# Patient Record
Sex: MALE | Race: WHITE | NOT HISPANIC OR LATINO
[De-identification: names, ages, dates, MRNs, and addresses within clinical notes are randomized per-mention and may not be internally consistent; named-entity substitution may affect disease eponyms.]

---

## 2020-01-24 PROBLEM — Z00.00 ENCOUNTER FOR PREVENTIVE HEALTH EXAMINATION: Status: ACTIVE | Noted: 2020-01-24

## 2020-01-27 ENCOUNTER — NON-APPOINTMENT (OUTPATIENT)
Age: 85
End: 2020-01-27

## 2020-01-27 ENCOUNTER — APPOINTMENT (OUTPATIENT)
Dept: OPHTHALMOLOGY | Facility: CLINIC | Age: 85
End: 2020-01-27
Payer: MEDICARE

## 2020-01-27 PROCEDURE — 92004 COMPRE OPH EXAM NEW PT 1/>: CPT

## 2021-01-25 ENCOUNTER — APPOINTMENT (OUTPATIENT)
Dept: OPHTHALMOLOGY | Facility: CLINIC | Age: 86
End: 2021-01-25

## 2023-08-07 ENCOUNTER — NON-APPOINTMENT (OUTPATIENT)
Age: 88
End: 2023-08-07

## 2023-08-09 ENCOUNTER — APPOINTMENT (OUTPATIENT)
Dept: OPHTHALMOLOGY | Facility: CLINIC | Age: 88
End: 2023-08-09
Payer: MEDICARE

## 2023-08-09 ENCOUNTER — NON-APPOINTMENT (OUTPATIENT)
Age: 88
End: 2023-08-09

## 2023-08-09 PROCEDURE — 92004 COMPRE OPH EXAM NEW PT 1/>: CPT

## 2023-08-09 PROCEDURE — 92134 CPTRZ OPH DX IMG PST SGM RTA: CPT

## 2023-08-15 RX ORDER — OFLOXACIN 0.3 %
1 DROPS OPHTHALMIC (EYE)
Refills: 0 | Status: COMPLETED | OUTPATIENT
Start: 2023-08-17 | End: 2023-08-17

## 2023-08-15 RX ORDER — TROPICAMIDE 1 %
1 DROPS OPHTHALMIC (EYE)
Refills: 0 | Status: COMPLETED | OUTPATIENT
Start: 2023-08-17 | End: 2023-08-17

## 2023-08-15 RX ORDER — KETOROLAC TROMETHAMINE 0.5 %
1 DROPS OPHTHALMIC (EYE)
Refills: 0 | Status: COMPLETED | OUTPATIENT
Start: 2023-08-17 | End: 2023-08-17

## 2023-08-15 RX ORDER — CYCLOPENTOLATE HYDROCHLORIDE 10 MG/ML
1 SOLUTION/ DROPS OPHTHALMIC
Refills: 0 | Status: COMPLETED | OUTPATIENT
Start: 2023-08-17 | End: 2023-08-17

## 2023-08-15 RX ORDER — SODIUM CHLORIDE 9 MG/ML
1000 INJECTION, SOLUTION INTRAVENOUS
Refills: 0 | Status: DISCONTINUED | OUTPATIENT
Start: 2023-08-17 | End: 2023-08-17

## 2023-08-15 RX ORDER — PHENYLEPHRINE HCL 2.5 %
1 DROPS OPHTHALMIC (EYE)
Refills: 0 | Status: COMPLETED | OUTPATIENT
Start: 2023-08-17 | End: 2023-08-17

## 2023-08-16 NOTE — ASU PATIENT PROFILE, ADULT - NSICDXPASTSURGICALHX_GEN_ALL_CORE_FT
PAST SURGICAL HISTORY:  H/O hernia repair     H/O prostatectomy     History of tonsillectomy     Status post Mohs surgery

## 2023-08-16 NOTE — ASU PATIENT PROFILE, ADULT - NS PREOP UNDERSTANDS INFO
No solid food/dairy/candy/gum after 11:00pm tonight, water allowed before 10:30am tomorrow; patient reminded to come with photo ID/insurance/credit card; no smoking/alcohol drinking/recreational drug use tonight; dress in comfortable clothes; no jewelries/contact lens/valuables; escort to have a photo ID; address and callback number was given./yes

## 2023-08-16 NOTE — ASU PATIENT PROFILE, ADULT - FALL HARM RISK - UNIVERSAL INTERVENTIONS
Bed in lowest position, wheels locked, appropriate side rails in place/Call bell, personal items and telephone in reach/Instruct patient to call for assistance before getting out of bed or chair/Non-slip footwear when patient is out of bed/[place] to call system/Physically safe environment - no spills, clutter or unnecessary equipment/Purposeful Proactive Rounding/Room/bathroom lighting operational, light cord in reach

## 2023-08-16 NOTE — ASU PATIENT PROFILE, ADULT - NSICDXPASTMEDICALHX_GEN_ALL_CORE_FT
PAST MEDICAL HISTORY:  Afib once  in 2020    CAD (coronary artery disease)     Dementia     History of DVT (deep vein thrombosis) unsure    HLD (hyperlipidemia)     HTN (hypertension)     Kidney cysts

## 2023-08-17 ENCOUNTER — OUTPATIENT (OUTPATIENT)
Dept: OUTPATIENT SERVICES | Facility: HOSPITAL | Age: 88
LOS: 1 days | Discharge: HOME | End: 2023-08-17

## 2023-08-17 ENCOUNTER — TRANSCRIPTION ENCOUNTER (OUTPATIENT)
Age: 88
End: 2023-08-17

## 2023-08-17 VITALS
DIASTOLIC BLOOD PRESSURE: 74 MMHG | RESPIRATION RATE: 16 BRPM | HEART RATE: 52 BPM | SYSTOLIC BLOOD PRESSURE: 141 MMHG | OXYGEN SATURATION: 96 % | WEIGHT: 169.32 LBS | TEMPERATURE: 97 F | HEIGHT: 68 IN

## 2023-08-17 VITALS
SYSTOLIC BLOOD PRESSURE: 128 MMHG | OXYGEN SATURATION: 97 % | RESPIRATION RATE: 14 BRPM | HEART RATE: 67 BPM | DIASTOLIC BLOOD PRESSURE: 69 MMHG

## 2023-08-17 DIAGNOSIS — Z90.89 ACQUIRED ABSENCE OF OTHER ORGANS: Chronic | ICD-10-CM

## 2023-08-17 DIAGNOSIS — Z90.79 ACQUIRED ABSENCE OF OTHER GENITAL ORGAN(S): Chronic | ICD-10-CM

## 2023-08-17 DIAGNOSIS — Z98.890 OTHER SPECIFIED POSTPROCEDURAL STATES: Chronic | ICD-10-CM

## 2023-08-17 DEVICE — LENS IOL TECNIS PROTEC ZCB00 21.5D
Type: IMPLANTABLE DEVICE | Status: NON-FUNCTIONAL
Removed: 2023-08-17

## 2023-08-17 DEVICE — RING MALYUGIN STRL 7MM
Type: IMPLANTABLE DEVICE | Status: NON-FUNCTIONAL
Removed: 2023-08-17

## 2023-08-17 RX ORDER — ACETAMINOPHEN 500 MG
650 TABLET ORAL ONCE
Refills: 0 | Status: DISCONTINUED | OUTPATIENT
Start: 2023-08-17 | End: 2023-08-17

## 2023-08-17 RX ADMIN — Medication 1 DROP(S): at 08:37

## 2023-08-17 RX ADMIN — CYCLOPENTOLATE HYDROCHLORIDE 1 DROP(S): 10 SOLUTION/ DROPS OPHTHALMIC at 08:36

## 2023-08-17 RX ADMIN — Medication 1 DROP(S): at 08:36

## 2023-08-17 RX ADMIN — Medication 1 DROP(S): at 08:25

## 2023-08-17 RX ADMIN — Medication 1 DROP(S): at 08:31

## 2023-08-17 RX ADMIN — CYCLOPENTOLATE HYDROCHLORIDE 1 DROP(S): 10 SOLUTION/ DROPS OPHTHALMIC at 08:31

## 2023-08-17 RX ADMIN — CYCLOPENTOLATE HYDROCHLORIDE 1 DROP(S): 10 SOLUTION/ DROPS OPHTHALMIC at 08:25

## 2023-08-17 NOTE — PRE-ANESTHESIA EVALUATION ADULT - NSANTHOSAYNRD_GEN_A_CORE
No. RIAN screening performed.  STOP BANG Legend: 0-2 = LOW Risk; 3-4 = INTERMEDIATE Risk; 5-8 = HIGH Risk

## 2023-08-17 NOTE — ASU PREOP CHECKLIST - SELECT TESTS ORDERED
Gen: NAD, AAOx3  HEENT: PERRL. EOMI. NC/AT. VF intact.  Neck: FROM, nontender.   Lungs: Clear b/l, no W/R/R.  Heart: S1, S2. NSR.  Abd: Soft, NT/ND. +BS  Exts: Pulses 2+ throughout, no C/C/E.  Neuro: Mild right facial asymmetry, o/w CNs II-XII intact. 5/5 str x4 extremities. Sensation to LT intact throughout. Negative pronator drift. Gait WNL. Following commands. Speech clear.
covid symp neg/Results in MD note

## 2023-08-18 NOTE — OPERATIVE REPORT - OPERATIVE RPOSRT DETAILS
PROCEDURE DATE: 08-17-23    OPERATION:  Phacoemulsification with lens implant, RIGHT eye, plus ORA, RIGHT eye.    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  Topical anesthetic was placed into the right eye, and while the patient was sitting up, a marking pen was used to measure the 3, 6, 9 and 12 o'clock positions of the right eye.  The patient was brought into the operating room where MAC anesthesia was induced, and the patient was prepped and draped in the usual manner for sterile ophthalmic surgery.  A Jesse speculum was placed into the eye and stabilized with two 4x4’s.  Topical lidocaine 4% was instilled over the cornea.  The surgeon sat temporally.  At this point, the Toric lens desired axis of positioning was then marked using a marking pen.  A paracentesis was then made, and intraocular lidocaine and viscoelastic was inserted into the eye.  Then, a biplanar incision was used with a 2.4 mm keratome to make an incision at approximately 3 o'clock to the left eye and approximately 9 o'clock for the right eye.  The anterior capsule was removed, and hydrodissection and hydrodelineation of the lens was then performed using BSS on a flat-tipped cannula.  Phacoemulsification of the nucleus was then performed by sculpting the nucleus into half and removing each quadrant after visco was injected.  Phacoemulsification of the two halves was performed without complication.  A Malyugin ring was then introduced in order to enlarge the pupil.    Any remaining cortical material was removed using irrigation and aspiration mechanical technique, both the anterior and posterior leaflets of the capsule were cleaned, and Healon was inserted in the bag to inflate the anterior and posterior leaflets.  At this time, an ORA was performed to ensure that the desired axis and power of implant was correct.  The Toric lens was then inserted into the  right eye and dialed into position at the correct axis.  A Barraquer tonometer was then used to determine that intraocular pressure of the eye was somewhere between 15 to 20 mmHg.  The ORA machine was then used to determine the correct intraocular lens implant which was confirmed at 21.5 diopters for an [id number] lens.  The ORA machine was turned off, and the Implant number [id number] , 21.5 diopter, was inserted into the bag and rotated into position.  At this point, the Malyugin ring was removed.  Any remaining Healon was then removed with careful attention to ensure that there was no rotation of the implant and that it remained on axis to correct the patient's astigmatism.    Miochol was then inserted into the eye until good pressure was obtained.  Hydration of both the paracentesis wounds was made using BSS and after careful attention to ensuring that the wound closure was made.  At this point, the Jesse speculum was removed, and a clear patch was placed over the eye.  The patient returned to the recovery room in stable and improved condition.

## 2023-09-08 ENCOUNTER — NON-APPOINTMENT (OUTPATIENT)
Age: 88
End: 2023-09-08

## 2023-09-08 ENCOUNTER — APPOINTMENT (OUTPATIENT)
Dept: OPHTHALMOLOGY | Facility: CLINIC | Age: 88
End: 2023-09-08
Payer: MEDICARE

## 2023-09-08 PROCEDURE — 92012 INTRM OPH EXAM EST PATIENT: CPT

## 2023-09-08 PROCEDURE — 92134 CPTRZ OPH DX IMG PST SGM RTA: CPT

## 2023-09-08 NOTE — ASU PATIENT PROFILE, ADULT - NSICDXPASTSURGICALHX_GEN_ALL_CORE_FT
PAST SURGICAL HISTORY:  H/O hernia repair     H/O prostatectomy     History of tonsillectomy     Status post Mohs surgery      PAST SURGICAL HISTORY:  H/O hernia repair     H/O prostatectomy     History of tonsillectomy     S/P cataract surgery right    Status post Mohs surgery

## 2023-09-08 NOTE — ASU PATIENT PROFILE, ADULT - NS PREOP UNDERSTANDS INFO
No solid food/dairy/candy/gum after midnight; water allowed before 07:00am tomorrow; patient to come with photo ID/insurance/credit card, dress in comfortable clothes; no jewelries/contact lens/valuables; no smoking/alcohol drinking/recreational drug use today; escort to have photo ID; address and callback number was given./yes

## 2023-09-11 ENCOUNTER — TRANSCRIPTION ENCOUNTER (OUTPATIENT)
Age: 88
End: 2023-09-11

## 2023-09-11 RX ORDER — PHENYLEPHRINE HCL 2.5 %
1 DROPS OPHTHALMIC (EYE)
Refills: 0 | Status: COMPLETED | OUTPATIENT
Start: 2023-09-12 | End: 2023-09-12

## 2023-09-11 RX ORDER — TROPICAMIDE 1 %
1 DROPS OPHTHALMIC (EYE)
Refills: 0 | Status: COMPLETED | OUTPATIENT
Start: 2023-09-12 | End: 2023-09-12

## 2023-09-11 RX ORDER — SODIUM CHLORIDE 9 MG/ML
1000 INJECTION, SOLUTION INTRAVENOUS
Refills: 0 | Status: DISCONTINUED | OUTPATIENT
Start: 2023-09-12 | End: 2023-09-12

## 2023-09-11 RX ORDER — OFLOXACIN 0.3 %
1 DROPS OPHTHALMIC (EYE)
Refills: 0 | Status: COMPLETED | OUTPATIENT
Start: 2023-09-12 | End: 2023-09-12

## 2023-09-11 RX ORDER — CYCLOPENTOLATE HYDROCHLORIDE 10 MG/ML
1 SOLUTION/ DROPS OPHTHALMIC
Refills: 0 | Status: COMPLETED | OUTPATIENT
Start: 2023-09-12 | End: 2023-09-12

## 2023-09-12 ENCOUNTER — APPOINTMENT (OUTPATIENT)
Dept: OPHTHALMOLOGY | Facility: AMBULATORY SURGERY CENTER | Age: 88
End: 2023-09-12

## 2023-09-12 ENCOUNTER — TRANSCRIPTION ENCOUNTER (OUTPATIENT)
Age: 88
End: 2023-09-12

## 2023-09-12 ENCOUNTER — NON-APPOINTMENT (OUTPATIENT)
Age: 88
End: 2023-09-12

## 2023-09-12 ENCOUNTER — OUTPATIENT (OUTPATIENT)
Dept: OUTPATIENT SERVICES | Facility: HOSPITAL | Age: 88
LOS: 1 days | Discharge: HOME | End: 2023-09-12
Payer: MEDICARE

## 2023-09-12 VITALS
DIASTOLIC BLOOD PRESSURE: 71 MMHG | OXYGEN SATURATION: 96 % | TEMPERATURE: 98 F | HEART RATE: 47 BPM | RESPIRATION RATE: 16 BRPM | SYSTOLIC BLOOD PRESSURE: 115 MMHG

## 2023-09-12 VITALS
HEIGHT: 68.5 IN | HEART RATE: 37 BPM | TEMPERATURE: 97 F | RESPIRATION RATE: 16 BRPM | WEIGHT: 172.62 LBS | SYSTOLIC BLOOD PRESSURE: 116 MMHG | OXYGEN SATURATION: 96 % | DIASTOLIC BLOOD PRESSURE: 55 MMHG

## 2023-09-12 DIAGNOSIS — Z98.890 OTHER SPECIFIED POSTPROCEDURAL STATES: Chronic | ICD-10-CM

## 2023-09-12 DIAGNOSIS — Z90.89 ACQUIRED ABSENCE OF OTHER ORGANS: Chronic | ICD-10-CM

## 2023-09-12 DIAGNOSIS — Z90.79 ACQUIRED ABSENCE OF OTHER GENITAL ORGAN(S): Chronic | ICD-10-CM

## 2023-09-12 DIAGNOSIS — Z98.49 CATARACT EXTRACTION STATUS, UNSPECIFIED EYE: Chronic | ICD-10-CM

## 2023-09-12 PROCEDURE — 67042 VIT FOR MACULAR HOLE: CPT | Mod: RT

## 2023-09-12 DEVICE — GAS IOL HEXAFLUORIDE CYL
Type: IMPLANTABLE DEVICE | Site: RIGHT | Status: NON-FUNCTIONAL
Removed: 2023-09-12

## 2023-09-12 RX ADMIN — Medication 1 DROP(S): at 09:55

## 2023-09-12 RX ADMIN — Medication 1 DROP(S): at 09:50

## 2023-09-12 RX ADMIN — Medication 1 DROP(S): at 09:45

## 2023-09-12 RX ADMIN — CYCLOPENTOLATE HYDROCHLORIDE 1 DROP(S): 10 SOLUTION/ DROPS OPHTHALMIC at 09:50

## 2023-09-12 RX ADMIN — CYCLOPENTOLATE HYDROCHLORIDE 1 DROP(S): 10 SOLUTION/ DROPS OPHTHALMIC at 09:45

## 2023-09-12 RX ADMIN — CYCLOPENTOLATE HYDROCHLORIDE 1 DROP(S): 10 SOLUTION/ DROPS OPHTHALMIC at 09:55

## 2023-09-13 ENCOUNTER — APPOINTMENT (OUTPATIENT)
Dept: OPHTHALMOLOGY | Facility: CLINIC | Age: 88
End: 2023-09-13
Payer: MEDICARE

## 2023-09-13 ENCOUNTER — NON-APPOINTMENT (OUTPATIENT)
Age: 88
End: 2023-09-13

## 2023-09-13 PROCEDURE — 99024 POSTOP FOLLOW-UP VISIT: CPT

## 2023-09-18 ENCOUNTER — NON-APPOINTMENT (OUTPATIENT)
Age: 88
End: 2023-09-18

## 2023-09-18 ENCOUNTER — APPOINTMENT (OUTPATIENT)
Dept: OPHTHALMOLOGY | Facility: CLINIC | Age: 88
End: 2023-09-18
Payer: MEDICARE

## 2023-09-18 PROCEDURE — 99024 POSTOP FOLLOW-UP VISIT: CPT

## 2023-09-25 ENCOUNTER — APPOINTMENT (OUTPATIENT)
Dept: OPHTHALMOLOGY | Facility: CLINIC | Age: 88
End: 2023-09-25
Payer: MEDICARE

## 2023-09-25 ENCOUNTER — NON-APPOINTMENT (OUTPATIENT)
Age: 88
End: 2023-09-25

## 2023-09-25 PROCEDURE — 99024 POSTOP FOLLOW-UP VISIT: CPT

## 2023-09-25 PROCEDURE — 92134 CPTRZ OPH DX IMG PST SGM RTA: CPT

## 2023-10-18 ENCOUNTER — NON-APPOINTMENT (OUTPATIENT)
Age: 88
End: 2023-10-18

## 2023-10-18 ENCOUNTER — APPOINTMENT (OUTPATIENT)
Dept: OPHTHALMOLOGY | Facility: CLINIC | Age: 88
End: 2023-10-18
Payer: MEDICARE

## 2023-10-18 PROCEDURE — 99024 POSTOP FOLLOW-UP VISIT: CPT

## 2023-11-02 RX ORDER — ASPIRIN/CALCIUM CARB/MAGNESIUM 324 MG
0 TABLET ORAL
Refills: 0 | DISCHARGE

## 2023-11-02 RX ORDER — AMLODIPINE BESYLATE 2.5 MG/1
1 TABLET ORAL
Refills: 0 | DISCHARGE

## 2023-11-02 RX ORDER — CANDESARTAN CILEXETIL 8 MG/1
1 TABLET ORAL
Refills: 0 | DISCHARGE

## 2023-11-02 RX ORDER — DONEPEZIL HYDROCHLORIDE 10 MG/1
1 TABLET, FILM COATED ORAL
Refills: 0 | DISCHARGE

## 2023-11-02 RX ORDER — ATORVASTATIN CALCIUM 80 MG/1
1 TABLET, FILM COATED ORAL
Refills: 0 | DISCHARGE

## 2023-11-15 ENCOUNTER — APPOINTMENT (OUTPATIENT)
Dept: OPHTHALMOLOGY | Facility: CLINIC | Age: 88
End: 2023-11-15
Payer: MEDICARE

## 2023-11-15 ENCOUNTER — NON-APPOINTMENT (OUTPATIENT)
Age: 88
End: 2023-11-15

## 2023-11-15 PROBLEM — F03.90 UNSPECIFIED DEMENTIA, UNSPECIFIED SEVERITY, WITHOUT BEHAVIORAL DISTURBANCE, PSYCHOTIC DISTURBANCE, MOOD DISTURBANCE, AND ANXIETY: Chronic | Status: ACTIVE | Noted: 2023-08-17

## 2023-11-15 PROBLEM — I48.91 UNSPECIFIED ATRIAL FIBRILLATION: Chronic | Status: ACTIVE | Noted: 2023-08-17

## 2023-11-15 PROBLEM — Z86.718 PERSONAL HISTORY OF OTHER VENOUS THROMBOSIS AND EMBOLISM: Chronic | Status: ACTIVE | Noted: 2023-08-17

## 2023-11-15 PROBLEM — I10 ESSENTIAL (PRIMARY) HYPERTENSION: Chronic | Status: ACTIVE | Noted: 2023-08-17

## 2023-11-15 PROBLEM — I25.10 ATHEROSCLEROTIC HEART DISEASE OF NATIVE CORONARY ARTERY WITHOUT ANGINA PECTORIS: Chronic | Status: ACTIVE | Noted: 2023-08-17

## 2023-11-15 PROBLEM — N28.1 CYST OF KIDNEY, ACQUIRED: Chronic | Status: ACTIVE | Noted: 2023-08-17

## 2023-11-15 PROBLEM — E78.5 HYPERLIPIDEMIA, UNSPECIFIED: Chronic | Status: ACTIVE | Noted: 2023-08-17

## 2023-11-15 PROCEDURE — 99024 POSTOP FOLLOW-UP VISIT: CPT

## 2024-01-24 ENCOUNTER — NON-APPOINTMENT (OUTPATIENT)
Age: 89
End: 2024-01-24

## 2024-01-24 ENCOUNTER — APPOINTMENT (OUTPATIENT)
Dept: OPHTHALMOLOGY | Facility: CLINIC | Age: 89
End: 2024-01-24
Payer: MEDICARE

## 2024-01-24 PROCEDURE — 92134 CPTRZ OPH DX IMG PST SGM RTA: CPT

## 2024-01-24 PROCEDURE — 92012 INTRM OPH EXAM EST PATIENT: CPT

## 2024-05-29 ENCOUNTER — APPOINTMENT (OUTPATIENT)
Dept: OPHTHALMOLOGY | Facility: CLINIC | Age: 89
End: 2024-05-29

## 2024-06-05 ENCOUNTER — NON-APPOINTMENT (OUTPATIENT)
Age: 89
End: 2024-06-05

## 2024-06-05 ENCOUNTER — APPOINTMENT (OUTPATIENT)
Dept: OPHTHALMOLOGY | Facility: CLINIC | Age: 89
End: 2024-06-05
Payer: MEDICARE

## 2024-06-05 PROCEDURE — 92012 INTRM OPH EXAM EST PATIENT: CPT

## 2024-06-05 PROCEDURE — 92134 CPTRZ OPH DX IMG PST SGM RTA: CPT

## 2025-06-11 ENCOUNTER — NON-APPOINTMENT (OUTPATIENT)
Age: 89
End: 2025-06-11

## 2025-06-11 ENCOUNTER — APPOINTMENT (OUTPATIENT)
Dept: OPHTHALMOLOGY | Facility: CLINIC | Age: 89
End: 2025-06-11
Payer: MEDICARE

## 2025-06-11 PROCEDURE — 92014 COMPRE OPH EXAM EST PT 1/>: CPT

## 2025-06-11 PROCEDURE — 92134 CPTRZ OPH DX IMG PST SGM RTA: CPT

## (undated) DEVICE — ILM FORCEP 25G

## (undated) DEVICE — SUT POLYSORB 8-0 5" MV-135-5

## (undated) DEVICE — Device

## (undated) DEVICE — APPLICATOR Q TIP 6" WOOD STEM

## (undated) DEVICE — NDL RETROBULBAR VISITEC 25X1.5

## (undated) DEVICE — PACK CENTURION 2.4MM

## (undated) DEVICE — SYR LUER LOK 1CC

## (undated) DEVICE — ELCTR WETFIELD ERASER FINE TIP 25GA

## (undated) DEVICE — PACK CENTURION FMS ACT.9 30 BAL

## (undated) DEVICE — CANNULA MEDONE DUAL BORE SIDEFLO 25G

## (undated) DEVICE — DRAPE MICROSCOPE KNOB COVER SMALL (2 PCS)

## (undated) DEVICE — NDL FILTER TW NOKOR 19GA 1.5"

## (undated) DEVICE — TRANSFORMER INTREPID I/A 0.3MM

## (undated) DEVICE — MARKING PEN W RULER

## (undated) DEVICE — GLV 6.5 PROTEXIS (WHITE)

## (undated) DEVICE — APPLICATOR COTTON TIP 6"

## (undated) DEVICE — PACK VITRECTOMY BEVEL 25G PLUS 10K

## (undated) DEVICE — KNIFE ALCON STANDARD FULL HANDLE 15 DEG (PINK)

## (undated) DEVICE — SOL IRR BAL SALT 500ML

## (undated) DEVICE — SYR LUER LOK 10CC

## (undated) DEVICE — PACK VITRECTOMY  LF

## (undated) DEVICE — NUCLEUS HYDRODISSECTOR PEARCE ANGLED 25G X 22MM

## (undated) DEVICE — PICK MICRO .6MM 23G

## (undated) DEVICE — SUT NYLON 10-0 12" CU-5

## (undated) DEVICE — TIP BACKFLUSH SOFT DISP 23GA

## (undated) DEVICE — ILM FORCEP 25G PLUS

## (undated) DEVICE — BACKFLUSH SOFT TIP 25G

## (undated) DEVICE — KNIFE ALCON SLIT INTREPID CLEAR-CUT SAFETY 2.4MM

## (undated) DEVICE — GLV 8 PROTEXIS (WHITE)